# Patient Record
Sex: FEMALE | Race: BLACK OR AFRICAN AMERICAN | NOT HISPANIC OR LATINO | URBAN - METROPOLITAN AREA
[De-identification: names, ages, dates, MRNs, and addresses within clinical notes are randomized per-mention and may not be internally consistent; named-entity substitution may affect disease eponyms.]

---

## 2022-12-12 ENCOUNTER — OFFICE VISIT (OUTPATIENT)
Dept: PRIMARY CARE CLINIC | Facility: CLINIC | Age: 27
End: 2022-12-12
Payer: MEDICAID

## 2022-12-12 VITALS
OXYGEN SATURATION: 100 % | DIASTOLIC BLOOD PRESSURE: 86 MMHG | BODY MASS INDEX: 26 KG/M2 | HEIGHT: 65 IN | HEART RATE: 93 BPM | SYSTOLIC BLOOD PRESSURE: 112 MMHG | TEMPERATURE: 98 F | WEIGHT: 156.06 LBS

## 2022-12-12 DIAGNOSIS — N92.1 MENOMETRORRHAGIA: ICD-10-CM

## 2022-12-12 DIAGNOSIS — R10.2 PELVIC PAIN: Primary | ICD-10-CM

## 2022-12-12 DIAGNOSIS — R35.89 POLYURIA: ICD-10-CM

## 2022-12-12 DIAGNOSIS — Z11.8 ENCOUNTER FOR SCREENING EXAMINATION FOR CHLAMYDIAL INFECTION: ICD-10-CM

## 2022-12-12 DIAGNOSIS — E05.90 SUBCLINICAL HYPERTHYROIDISM: ICD-10-CM

## 2022-12-12 PROCEDURE — 1160F RVW MEDS BY RX/DR IN RCRD: CPT | Mod: CPTII,,, | Performed by: FAMILY MEDICINE

## 2022-12-12 PROCEDURE — 99204 OFFICE O/P NEW MOD 45 MIN: CPT | Mod: PBBFAC,PN | Performed by: FAMILY MEDICINE

## 2022-12-12 PROCEDURE — 3074F SYST BP LT 130 MM HG: CPT | Mod: CPTII,,, | Performed by: FAMILY MEDICINE

## 2022-12-12 PROCEDURE — 99999 PR PBB SHADOW E&M-NEW PATIENT-LVL IV: CPT | Mod: PBBFAC,,, | Performed by: FAMILY MEDICINE

## 2022-12-12 PROCEDURE — 99214 PR OFFICE/OUTPT VISIT, EST, LEVL IV, 30-39 MIN: ICD-10-PCS | Mod: S$PBB,,, | Performed by: FAMILY MEDICINE

## 2022-12-12 PROCEDURE — 99999 PR PBB SHADOW E&M-NEW PATIENT-LVL IV: ICD-10-PCS | Mod: PBBFAC,,, | Performed by: FAMILY MEDICINE

## 2022-12-12 PROCEDURE — 3079F DIAST BP 80-89 MM HG: CPT | Mod: CPTII,,, | Performed by: FAMILY MEDICINE

## 2022-12-12 PROCEDURE — 1159F MED LIST DOCD IN RCRD: CPT | Mod: CPTII,,, | Performed by: FAMILY MEDICINE

## 2022-12-12 PROCEDURE — 3074F PR MOST RECENT SYSTOLIC BLOOD PRESSURE < 130 MM HG: ICD-10-PCS | Mod: CPTII,,, | Performed by: FAMILY MEDICINE

## 2022-12-12 PROCEDURE — 1160F PR REVIEW ALL MEDS BY PRESCRIBER/CLIN PHARMACIST DOCUMENTED: ICD-10-PCS | Mod: CPTII,,, | Performed by: FAMILY MEDICINE

## 2022-12-12 PROCEDURE — 3008F BODY MASS INDEX DOCD: CPT | Mod: CPTII,,, | Performed by: FAMILY MEDICINE

## 2022-12-12 PROCEDURE — 3079F PR MOST RECENT DIASTOLIC BLOOD PRESSURE 80-89 MM HG: ICD-10-PCS | Mod: CPTII,,, | Performed by: FAMILY MEDICINE

## 2022-12-12 PROCEDURE — 99214 OFFICE O/P EST MOD 30 MIN: CPT | Mod: S$PBB,,, | Performed by: FAMILY MEDICINE

## 2022-12-12 PROCEDURE — 1159F PR MEDICATION LIST DOCUMENTED IN MEDICAL RECORD: ICD-10-PCS | Mod: CPTII,,, | Performed by: FAMILY MEDICINE

## 2022-12-12 PROCEDURE — 3008F PR BODY MASS INDEX (BMI) DOCUMENTED: ICD-10-PCS | Mod: CPTII,,, | Performed by: FAMILY MEDICINE

## 2022-12-12 RX ORDER — ALPRAZOLAM 0.25 MG/1
0.25 TABLET ORAL DAILY PRN
COMMUNITY
Start: 2022-12-01

## 2022-12-12 RX ORDER — CITALOPRAM 20 MG/1
20 TABLET, FILM COATED ORAL EVERY MORNING
COMMUNITY
Start: 2022-11-22

## 2022-12-12 NOTE — PROGRESS NOTES
"Subjective:       Patient ID: Felicia Oh is a 27 y.o. female.    Chief Complaint: Establish Care Gyn referral (Pelvic and lower back discomfort/frequent urination/abnormal menstrual-heavy painful)        History of Present Illness:   Felicia Oh 27 y.o. female presents today with   She is here for to establish care and for gyn referral  C/O frequent urination and left flank pain. Asso with pelvic discomfort that precede the urination. UA is negative x 2.                 C/O irregular and painful period which is refractory to several diff medication per her PCP-asking for referral to gyn. LMP is 12/02.    Past Medical History:   Diagnosis Date    Anxiety     Depression      No family history on file.  Social History     Socioeconomic History    Marital status: Single   Tobacco Use    Smoking status: Never     Passive exposure: Never    Smokeless tobacco: Never   Substance and Sexual Activity    Alcohol use: Yes     Comment: social    Sexual activity: Not Currently     Outpatient Encounter Medications as of 12/12/2022   Medication Sig Dispense Refill    ALPRAZolam (XANAX) 0.25 MG tablet Take 0.25 mg by mouth daily as needed.      citalopram (CELEXA) 20 MG tablet Take 20 mg by mouth every morning.       No facility-administered encounter medications on file as of 12/12/2022.       Review of Systems    Review of Systems      A complete 10 point ROS was completed and are positive as per above HPI.    Otherwise negative for fever, diplopia, chest pain, shortness of breath, vomiting, blood in urine, joint pain, skin rash, seizures and unusual bleeding.     Objective:      /86 (BP Location: Left arm, Patient Position: Sitting, BP Method: Medium (Manual))   Pulse 93   Temp 98.1 °F (36.7 °C) (Temporal)   Ht 5' 5" (1.651 m)   Wt 70.8 kg (156 lb 1.4 oz)   LMP 12/01/2022 (Exact Date)   SpO2 100%   BMI 25.97 kg/m²   Physical Exam  Abdominal:      General: Abdomen is flat.      Palpations: Abdomen is soft.      " Tenderness: There is abdominal tenderness in the suprapubic area.           CONSTITUTIONAL: No apparent distress. Appears comfortable. Does not appear acutely ill or septic. Appears adequately hydrated.  CARDIOVASCULAR: No perioral cyanosis  PULMONARY: Breathing unlabored. No retractions Chest expansion grossly normal.  PSYCHIATRIC: Alert and conversant and grossly oriented. Mood is grossly neutral. Affect appropriate. Judgment and insight grossly intact.  NEUROLOGIC: No focal sensory deficits reported.   No results found for this or any previous visit.  Assessment:       1. Pelvic pain    2. Menometrorrhagia    3. Polyuria    4. Encounter for screening examination for chlamydial infection    5. Subclinical hyperthyroidism        Plan:   Pelvic pain  -     Sedimentation rate; Future; Expected date: 12/12/2022  -     C-Reactive Protein; Future; Expected date: 12/12/2022  -     Cancel: US Pelvis Limited Non OB; Future; Expected date: 12/12/2022    Menometrorrhagia  -     Ambulatory referral/consult to Gynecology; Future; Expected date: 12/19/2022  -     US Pelvis Complete Non OB; Future; Expected date: 12/12/2022    Polyuria  -     Comprehensive Metabolic Panel; Future; Expected date: 12/12/2022  -     Hemoglobin A1C; Future; Expected date: 12/12/2022    Encounter for screening examination for chlamydial infection  -     C. trachomatis/N. gonorrhoeae by AMP DNA; Future; Expected date: 12/12/2022    Subclinical hyperthyroidism  -     TSH; Future; Expected date: 12/12/2022  -     T4, FREE; Future; Expected date: 12/12/2022    I have reviewed all of the patient's clinical history available in care everywhere and Epic and have utilized this in my evaluation and management recommendations today.   DDX include fibroids and urogenital infection and DM Vs endometriosis.  Update TSH surveillance and kidney function.  Treatment options and alternatives were discussed with the patient. Patient was given ample time to ask  questions. All questions were answered. Voices understanding and acceptance of this advice. Will call back if any further questions or concerns.  Lesly Landers MD

## 2022-12-14 ENCOUNTER — PATIENT MESSAGE (OUTPATIENT)
Dept: OBSTETRICS AND GYNECOLOGY | Facility: CLINIC | Age: 27
End: 2022-12-14
Payer: MEDICAID

## 2022-12-19 ENCOUNTER — HOSPITAL ENCOUNTER (OUTPATIENT)
Dept: RADIOLOGY | Facility: HOSPITAL | Age: 27
Discharge: HOME | End: 2022-12-19
Attending: FAMILY MEDICINE
Payer: MEDICAID

## 2022-12-19 DIAGNOSIS — N92.1 MENOMETRORRHAGIA: ICD-10-CM

## 2022-12-20 ENCOUNTER — HOSPITAL ENCOUNTER (OUTPATIENT)
Dept: RADIOLOGY | Facility: HOSPITAL | Age: 27
Discharge: HOME | End: 2022-12-20
Attending: FAMILY MEDICINE
Payer: MEDICAID

## 2022-12-20 PROCEDURE — 76856 US EXAM PELVIC COMPLETE: CPT | Mod: TC

## 2022-12-20 PROCEDURE — 76830 TRANSVAGINAL US NON-OB: CPT | Mod: 26,,, | Performed by: RADIOLOGY

## 2022-12-20 PROCEDURE — 76856 US EXAM PELVIC COMPLETE: CPT | Mod: 26,,, | Performed by: RADIOLOGY

## 2022-12-20 PROCEDURE — 76830 US PELVIS COMP WITH TRANSVAG NON-OB (XPD): ICD-10-PCS | Mod: 26,,, | Performed by: RADIOLOGY

## 2022-12-20 PROCEDURE — 76856 US PELVIS COMP WITH TRANSVAG NON-OB (XPD): ICD-10-PCS | Mod: 26,,, | Performed by: RADIOLOGY

## 2022-12-21 ENCOUNTER — PATIENT MESSAGE (OUTPATIENT)
Dept: PRIMARY CARE CLINIC | Facility: CLINIC | Age: 27
End: 2022-12-21
Payer: MEDICAID

## 2022-12-26 ENCOUNTER — PATIENT MESSAGE (OUTPATIENT)
Dept: PRIMARY CARE CLINIC | Facility: CLINIC | Age: 27
End: 2022-12-26
Payer: MEDICAID

## 2023-01-13 DIAGNOSIS — N94.9 ADNEXAL CYST: Primary | ICD-10-CM

## 2023-01-13 NOTE — PROGRESS NOTES
Your US showed a right ovary cyst and repeat US is recommended in 6 weeks for resolution.  @staff: see order. Thanks

## 2023-01-31 ENCOUNTER — TELEPHONE (OUTPATIENT)
Dept: PRIMARY CARE CLINIC | Facility: CLINIC | Age: 28
End: 2023-01-31
Payer: MEDICAID

## 2023-01-31 NOTE — TELEPHONE ENCOUNTER
----- Message from Lesly Landers MD sent at 1/13/2023  8:38 AM CST -----   Your US showed a right ovary cyst and repeat US is recommended in 6 weeks for resolution.  @staff: see order. Thanks

## 2023-06-27 ENCOUNTER — PATIENT MESSAGE (OUTPATIENT)
Dept: RESEARCH | Facility: HOSPITAL | Age: 28
End: 2023-06-27
Payer: MEDICAID